# Patient Record
Sex: FEMALE | Race: OTHER | HISPANIC OR LATINO
[De-identification: names, ages, dates, MRNs, and addresses within clinical notes are randomized per-mention and may not be internally consistent; named-entity substitution may affect disease eponyms.]

---

## 2018-01-25 ENCOUNTER — LABORATORY RESULT (OUTPATIENT)
Age: 38
End: 2018-01-25

## 2018-01-26 ENCOUNTER — APPOINTMENT (OUTPATIENT)
Dept: OBGYN | Facility: CLINIC | Age: 38
End: 2018-01-26
Payer: MEDICAID

## 2018-01-26 VITALS
BODY MASS INDEX: 23 KG/M2 | SYSTOLIC BLOOD PRESSURE: 120 MMHG | WEIGHT: 125 LBS | HEIGHT: 62 IN | DIASTOLIC BLOOD PRESSURE: 80 MMHG

## 2018-01-26 DIAGNOSIS — N97.9 FEMALE INFERTILITY, UNSPECIFIED: ICD-10-CM

## 2018-01-26 PROCEDURE — 99212 OFFICE O/P EST SF 10 MIN: CPT | Mod: 25

## 2018-01-26 PROCEDURE — 99395 PREV VISIT EST AGE 18-39: CPT

## 2019-04-05 ENCOUNTER — APPOINTMENT (OUTPATIENT)
Dept: OBGYN | Facility: CLINIC | Age: 39
End: 2019-04-05
Payer: MEDICAID

## 2019-04-05 VITALS — DIASTOLIC BLOOD PRESSURE: 70 MMHG | BODY MASS INDEX: 26.89 KG/M2 | WEIGHT: 147 LBS | SYSTOLIC BLOOD PRESSURE: 122 MMHG

## 2019-04-05 PROCEDURE — 99395 PREV VISIT EST AGE 18-39: CPT

## 2020-08-13 ENCOUNTER — LABORATORY RESULT (OUTPATIENT)
Age: 40
End: 2020-08-13

## 2020-08-14 ENCOUNTER — APPOINTMENT (OUTPATIENT)
Dept: OBGYN | Facility: CLINIC | Age: 40
End: 2020-08-14
Payer: MEDICAID

## 2020-08-14 ENCOUNTER — APPOINTMENT (OUTPATIENT)
Dept: OBGYN | Facility: CLINIC | Age: 40
End: 2020-08-14

## 2020-08-14 VITALS
SYSTOLIC BLOOD PRESSURE: 122 MMHG | DIASTOLIC BLOOD PRESSURE: 74 MMHG | WEIGHT: 139 LBS | TEMPERATURE: 97 F | BODY MASS INDEX: 25.42 KG/M2

## 2020-08-14 PROCEDURE — 99395 PREV VISIT EST AGE 18-39: CPT

## 2021-08-11 ENCOUNTER — LABORATORY RESULT (OUTPATIENT)
Age: 41
End: 2021-08-11

## 2021-08-12 ENCOUNTER — APPOINTMENT (OUTPATIENT)
Dept: OBGYN | Facility: CLINIC | Age: 41
End: 2021-08-12
Payer: MEDICAID

## 2021-08-12 VITALS — BODY MASS INDEX: 23.78 KG/M2 | WEIGHT: 130 LBS | DIASTOLIC BLOOD PRESSURE: 70 MMHG | SYSTOLIC BLOOD PRESSURE: 124 MMHG

## 2021-08-12 PROCEDURE — 99396 PREV VISIT EST AGE 40-64: CPT

## 2022-02-16 ENCOUNTER — APPOINTMENT (OUTPATIENT)
Dept: OTOLARYNGOLOGY | Facility: CLINIC | Age: 42
End: 2022-02-16
Payer: MEDICAID

## 2022-02-16 ENCOUNTER — NON-APPOINTMENT (OUTPATIENT)
Age: 42
End: 2022-02-16

## 2022-02-16 VITALS
TEMPERATURE: 97.9 F | BODY MASS INDEX: 23 KG/M2 | HEART RATE: 85 BPM | DIASTOLIC BLOOD PRESSURE: 78 MMHG | SYSTOLIC BLOOD PRESSURE: 117 MMHG | WEIGHT: 125 LBS | HEIGHT: 62 IN

## 2022-02-16 DIAGNOSIS — M26.609 UNSPECIFIED TEMPOROMANDIBULAR JOINT DISORDER: ICD-10-CM

## 2022-02-16 PROCEDURE — 99204 OFFICE O/P NEW MOD 45 MIN: CPT | Mod: 25

## 2022-02-16 NOTE — HISTORY OF PRESENT ILLNESS
[No] : patient does not have a  history of radiation therapy [de-identified] : 40 yo female\par Recurrent vertigo w/ assoc left tinnitus now w/ bilat tinnitus x months-yrs\par Hearing Losss L>R\par tx w/ po and IT steroids by another ENT\par Alos taking a diuretic for r/o Meniere's syndrome\par h/o bruxism in past and\par increased anxiety and stress at home (autistic child)\par no other modifying factors\par no nasal or throat complaints\par  [Tinnitus] : tinnitus [Vertigo] : vertigo [Dizziness] : dizziness [Hearing Loss] : hearing loss [Meniere Disease] : Meniere disease [Early Onset Hearing Loss] : no early onset hearing loss [Stroke] : no stroke [Allergic Rhinitis] : no allergic rhinitis [Environmental Allergies] : no environmental allergies [Adenoidectomy] : no adenoidectomy [Allergies] : no allergies [Asthma] : no asthma [Hyperthyroidism] : no hyperthyroidism [Sialadenitis] : no sialadenitis [Hodgkin Disease] : no hodgkin disease [Non-Hodgkin Lymphoma] : no non-hodgkin lymphoma [None] : No risk factors have been identified. [Graves Disease] : no graves disease [Thyroid Cancer] : no thyroid cancer

## 2022-02-16 NOTE — REASON FOR VISIT
[Initial Evaluation] : an initial evaluation for [Tinnitus] : tinnitus [Vertigo] : vertigo [FreeTextEntry2] : ringing in ears

## 2022-02-16 NOTE — DATA REVIEWED
[de-identified] : audio personall reviewed\par SNHL L>R [de-identified] : personally reviewed-alejandrol

## 2022-02-16 NOTE — REVIEW OF SYSTEMS
[Ear Itch] : ear itch [Hearing Loss] : hearing loss [Dizziness] : dizziness [Vertigo] : vertigo [Negative] : Allergies [Ear Pain] : no ear pain [Recurrent Ear Infections] : no recurrent ear infections [Lightheadedness] : no lightheadedness [Ear Drainage] : no ear drainage [Ear Noises] : no ear noises [de-identified] : hearing loss in left ear

## 2022-03-30 ENCOUNTER — APPOINTMENT (OUTPATIENT)
Dept: OTOLARYNGOLOGY | Facility: CLINIC | Age: 42
End: 2022-03-30
Payer: MEDICAID

## 2022-03-30 VITALS
BODY MASS INDEX: 23 KG/M2 | HEART RATE: 78 BPM | DIASTOLIC BLOOD PRESSURE: 73 MMHG | SYSTOLIC BLOOD PRESSURE: 108 MMHG | HEIGHT: 62 IN | TEMPERATURE: 98.2 F | WEIGHT: 125 LBS

## 2022-03-30 PROCEDURE — 99214 OFFICE O/P EST MOD 30 MIN: CPT | Mod: 25

## 2022-03-30 NOTE — END OF VISIT
[FreeTextEntry3] : I personally saw and examined SOPHIA SANCHEZ in detail. I spoke to SANJU Giang regarding the assessment and plan of care. I reviewed the above assessment and plan of care, and agree. I have made changes in changes in the body of the note where appropriate.I personally reviewed the HPI, PMH, FH, SH, ROS and medications/allergies. I have spoken to SANJU Giang regarding the history and have personally determined the assessment and plan of care, and documented this myself. I was present and participated in all key portions of the encounter and all procedures noted above. I have made changes in the body of the note where appropriate.\par \par Attesting Faculty: See Attending Signature Below \par \par \par  [Time Spent: ___ minutes] : I have spent [unfilled] minutes of time on the encounter.

## 2022-03-30 NOTE — REASON FOR VISIT
[Tinnitus] : tinnitus [Vertigo] : vertigo [Subsequent Evaluation] : a subsequent evaluation for [FreeTextEntry2] : ringing in ears

## 2022-03-30 NOTE — ASSESSMENT
[FreeTextEntry1] : Ms. SANCHEZ 41 year F with Left Menieres Disease presents for follow up.\par \par Menieres Disease:\par -continue diuretic \par -low salt diet advised\par -Acupuncture advised\par \par Ear itch due to Wax\par -Cerumen is removed from the right and left  ear canal with a curette and suction.\par -Rx:Debrox be placed in both ears on a routine basis to keep them free of wax.\par -Routine debridement suggested to keep the ears free of wax.\par \par f/u prn

## 2022-03-30 NOTE — HISTORY OF PRESENT ILLNESS
[No] : patient does not have a  history of radiation therapy [Tinnitus] : tinnitus [Vertigo] : vertigo [Dizziness] : dizziness [Hearing Loss] : hearing loss [Meniere Disease] : Meniere disease [None] : No risk factors have been identified. [de-identified] : 42 yo female\par Recurrent vertigo w/ assoc left tinnitus now w/ bilat tinnitus x months-yrs\par Hearing Losss L>R\par tx w/ po and IT steroids by another ENT\par Alos taking a diuretic for r/o Meniere's syndrome\par h/o bruxism in past and\par increased anxiety and stress at home (autistic child)\par no other modifying factors\par no nasal or throat complaints\par  [FreeTextEntry1] : 3/30/202\par Patient presents for follow up. States she is doing well with the water pill. SHe tried getting off the water pill the fullness and tinnitus returned in the left ear. Dizziness is controlled. She also complains of bilateral ear itch.  [Early Onset Hearing Loss] : no early onset hearing loss [Stroke] : no stroke [Allergic Rhinitis] : no allergic rhinitis [Environmental Allergies] : no environmental allergies [Adenoidectomy] : no adenoidectomy [Allergies] : no allergies [Asthma] : no asthma [Hyperthyroidism] : no hyperthyroidism [Sialadenitis] : no sialadenitis [Hodgkin Disease] : no hodgkin disease [Non-Hodgkin Lymphoma] : no non-hodgkin lymphoma [Graves Disease] : no graves disease [Thyroid Cancer] : no thyroid cancer

## 2022-03-30 NOTE — PHYSICAL EXAM
[Fukuda Step Test] : Fukuda Step Test was Positive [Midline] : trachea located in midline position [Normal] : no rashes [Hearing Loss Right Only] : normal [Hearing Loss Left Only] : normal [Nystagmus] : ~T no ~M nystagmus was seen [FreeTextEntry8] : wax [FreeTextEntry9] : wax [de-identified] : Sway to the right

## 2022-03-30 NOTE — DATA REVIEWED
[de-identified] : audio personall reviewed\par SNHL L>R [de-identified] : personally reviewed-alejandrol

## 2022-03-30 NOTE — REVIEW OF SYSTEMS
[Ear Itch] : ear itch [Hearing Loss] : hearing loss [Dizziness] : dizziness [Vertigo] : vertigo [Negative] : Allergies [Ear Pain] : no ear pain [Recurrent Ear Infections] : no recurrent ear infections [Lightheadedness] : no lightheadedness [Ear Drainage] : no ear drainage [Ear Noises] : no ear noises [de-identified] : hearing loss in left ear

## 2022-04-01 ENCOUNTER — NON-APPOINTMENT (OUTPATIENT)
Age: 42
End: 2022-04-01

## 2022-04-01 RX ORDER — MOMETASONE FUROATE 1 MG/ML
0.1 SOLUTION TOPICAL
Qty: 1 | Refills: 5 | Status: ACTIVE | COMMUNITY
Start: 2022-04-01 | End: 1900-01-01

## 2022-06-23 ENCOUNTER — APPOINTMENT (OUTPATIENT)
Dept: OTOLARYNGOLOGY | Facility: CLINIC | Age: 42
End: 2022-06-23
Payer: MEDICAID

## 2022-06-23 VITALS
BODY MASS INDEX: 22.08 KG/M2 | TEMPERATURE: 98.1 F | DIASTOLIC BLOOD PRESSURE: 70 MMHG | SYSTOLIC BLOOD PRESSURE: 104 MMHG | WEIGHT: 120 LBS | HEIGHT: 62 IN | HEART RATE: 77 BPM

## 2022-06-23 DIAGNOSIS — L29.9 PRURITUS, UNSPECIFIED: ICD-10-CM

## 2022-06-23 PROCEDURE — 99214 OFFICE O/P EST MOD 30 MIN: CPT | Mod: 25

## 2022-06-23 PROCEDURE — 92567 TYMPANOMETRY: CPT

## 2022-06-23 PROCEDURE — 92588 EVOKED AUDITORY TST COMPLETE: CPT

## 2022-06-23 PROCEDURE — 92557 COMPREHENSIVE HEARING TEST: CPT

## 2022-06-23 RX ORDER — CHOLECALCIFEROL (VITAMIN D3) 1250 MCG
1.25 MG CAPSULE ORAL
Qty: 4 | Refills: 0 | Status: ACTIVE | COMMUNITY
Start: 2022-05-07

## 2022-06-23 RX ORDER — FOLIC ACID 1 MG/1
1 TABLET ORAL
Qty: 30 | Refills: 0 | Status: ACTIVE | COMMUNITY
Start: 2021-12-06

## 2022-06-23 RX ORDER — PREDNISONE 20 MG/1
20 TABLET ORAL
Qty: 8 | Refills: 0 | Status: ACTIVE | COMMUNITY
Start: 2022-06-20

## 2022-06-23 RX ORDER — AMOXICILLIN 500 MG/1
500 CAPSULE ORAL
Qty: 30 | Refills: 0 | Status: ACTIVE | COMMUNITY
Start: 2022-06-19

## 2022-06-23 RX ORDER — CIPROFLOXACIN HYDROCHLORIDE 500 MG/1
500 TABLET, FILM COATED ORAL
Qty: 14 | Refills: 0 | Status: ACTIVE | COMMUNITY
Start: 2022-06-01

## 2022-06-23 NOTE — HISTORY OF PRESENT ILLNESS
[No] : patient does not have a  history of radiation therapy [Tinnitus] : tinnitus [Vertigo] : vertigo [Dizziness] : dizziness [Hearing Loss] : hearing loss [Meniere Disease] : Meniere disease [None] : No risk factors have been identified. [de-identified] : 41 yo female\par  Dizzy x 5 days and decreased hearing left ear despite po steroids and diet control\par recently stopped taking diuretic\par no other modifying factors\par no nasal or throat complaints\par  [Early Onset Hearing Loss] : no early onset hearing loss [Stroke] : no stroke [Allergic Rhinitis] : no allergic rhinitis [Adenoidectomy] : no adenoidectomy [Allergies] : no allergies [Asthma] : no asthma [Hyperthyroidism] : no hyperthyroidism [Sialadenitis] : no sialadenitis [Hodgkin Disease] : no hodgkin disease [Non-Hodgkin Lymphoma] : no non-hodgkin lymphoma [Graves Disease] : no graves disease [Thyroid Cancer] : no thyroid cancer

## 2022-06-23 NOTE — PHYSICAL EXAM
[Fukuda Step Test] : Fukuda Step Test was Positive [Midline] : trachea located in midline position [Normal] : no rashes [Nystagmus] : ~T no ~M nystagmus was seen [Fistula Sign] : Fistula Sign: Negative [de-identified] : weak step test [de-identified] : weak Analia

## 2022-06-23 NOTE — DATA REVIEWED
[de-identified] : Hearing Test performed to evaluate the extent of hearing loss and  to explain pt's symptoms\par today's hearing test was personally reviewed and revealed\par AD- -8000 Hz, AS- WNL sloping to mild SNHL 250-8000 Hz. Type A tymps AU. Excellent WRS AU.

## 2022-07-01 ENCOUNTER — NON-APPOINTMENT (OUTPATIENT)
Age: 42
End: 2022-07-01

## 2022-07-05 ENCOUNTER — NON-APPOINTMENT (OUTPATIENT)
Age: 42
End: 2022-07-05

## 2022-07-08 ENCOUNTER — NON-APPOINTMENT (OUTPATIENT)
Age: 42
End: 2022-07-08

## 2022-07-13 ENCOUNTER — APPOINTMENT (OUTPATIENT)
Dept: OTOLARYNGOLOGY | Facility: CLINIC | Age: 42
End: 2022-07-13

## 2022-07-13 VITALS
DIASTOLIC BLOOD PRESSURE: 68 MMHG | WEIGHT: 120 LBS | HEART RATE: 82 BPM | SYSTOLIC BLOOD PRESSURE: 115 MMHG | HEIGHT: 62 IN | TEMPERATURE: 97.9 F | BODY MASS INDEX: 22.08 KG/M2

## 2022-07-13 DIAGNOSIS — R42 DIZZINESS AND GIDDINESS: ICD-10-CM

## 2022-07-13 PROCEDURE — 99214 OFFICE O/P EST MOD 30 MIN: CPT

## 2022-07-13 RX ORDER — DIAZEPAM 2 MG/1
2 TABLET ORAL
Qty: 40 | Refills: 0 | Status: ACTIVE | COMMUNITY
Start: 2022-07-13 | End: 1900-01-01

## 2022-07-13 NOTE — PHYSICAL EXAM
[Hearing Loss Right Only] : normal [Hearing Loss Left Only] : normal [Nystagmus] : ~T no ~M nystagmus was seen [Fukuda Step Test] : Fukuda Step Test was Negative [de-identified] : weak step test  [Midline] : trachea located in midline position [Normal] : no rashes

## 2022-07-13 NOTE — HISTORY OF PRESENT ILLNESS
[de-identified] : 41 yo female\par Patient with hx of Left Menieres Disease complains she was in the ED last week for dizziness. States she is feeling extremely dizzy, any movement she makes her dizzy. She was treated with Prednisone lakesha at the ED yet she continues to feel off balance. She is taking HCTZ and watching her salt intake. She does not notice any change in hearing with dizziness.  [Ear Fullness] : ear fullness [Tinnitus] : tinnitus [Dizziness] : dizziness [Hearing Loss] : hearing loss [Meniere Disease] : Meniere disease [Early Onset Hearing Loss] : no early onset hearing loss [Stroke] : no stroke [Allergic Rhinitis] : no allergic rhinitis [Adenoidectomy] : no adenoidectomy [Allergies] : no allergies [Asthma] : no asthma [None] : No associated symptoms are reported.

## 2022-07-13 NOTE — ASSESSMENT
[FreeTextEntry1] : Ms. SANCHEZ 42 year F with Left Menieres Disease complains of feeling dizzy and off balance since last visit. She was in the ED last week, treated with Prednisone lakesha \par \par Left Menieres Disease:\par -Rx: Valium, take when with vertigo \par Rx: Betahistine \par already on diuretic and diet\par -VNG/ABR/ECOG ordered, will call with results \par -Possible IT steroids \par \par f/u after data collection

## 2022-07-29 ENCOUNTER — APPOINTMENT (OUTPATIENT)
Dept: OTOLARYNGOLOGY | Facility: CLINIC | Age: 42
End: 2022-07-29

## 2022-07-29 PROCEDURE — 92540 BASIC VESTIBULAR EVALUATION: CPT

## 2022-07-29 PROCEDURE — 92547 SUPPLEMENTAL ELECTRICAL TEST: CPT

## 2022-07-29 PROCEDURE — 92537 CALORIC VSTBLR TEST W/REC: CPT

## 2022-07-29 PROCEDURE — 92584 ELECTROCOCHLEOGRAPHY: CPT

## 2022-07-29 PROCEDURE — 92567 TYMPANOMETRY: CPT

## 2022-07-29 PROCEDURE — ZZZZZ: CPT

## 2022-07-29 PROCEDURE — 92653 AEP NEURODIAGNOSTIC I&R: CPT

## 2022-08-02 ENCOUNTER — NON-APPOINTMENT (OUTPATIENT)
Age: 42
End: 2022-08-02

## 2022-09-14 ENCOUNTER — LABORATORY RESULT (OUTPATIENT)
Age: 42
End: 2022-09-14

## 2022-09-15 ENCOUNTER — APPOINTMENT (OUTPATIENT)
Dept: OBGYN | Facility: CLINIC | Age: 42
End: 2022-09-15

## 2022-09-15 VITALS — DIASTOLIC BLOOD PRESSURE: 76 MMHG | BODY MASS INDEX: 21.95 KG/M2 | SYSTOLIC BLOOD PRESSURE: 118 MMHG | WEIGHT: 120 LBS

## 2022-09-15 PROCEDURE — 99396 PREV VISIT EST AGE 40-64: CPT

## 2022-09-23 ENCOUNTER — APPOINTMENT (OUTPATIENT)
Dept: OTOLARYNGOLOGY | Facility: CLINIC | Age: 42
End: 2022-09-23

## 2022-09-23 VITALS
BODY MASS INDEX: 22.08 KG/M2 | WEIGHT: 120 LBS | TEMPERATURE: 97.2 F | HEART RATE: 60 BPM | SYSTOLIC BLOOD PRESSURE: 101 MMHG | HEIGHT: 62 IN | DIASTOLIC BLOOD PRESSURE: 72 MMHG

## 2022-09-23 PROCEDURE — 99213 OFFICE O/P EST LOW 20 MIN: CPT

## 2022-09-23 NOTE — ASSESSMENT
[FreeTextEntry1] : Ms. SANCHEZ 42 year F with Left Menieres Disease presents for follow up, room spinning resolved. She is going to Vestibular rehab for imbalance which is helping. \par \par Left Menieres Disease:\par -Continue with HCTZ qod  and low salt diet \par -continue with vestibular rehab and at home exercises \par \par \par f/u 4 months or prn

## 2022-09-23 NOTE — PHYSICAL EXAM
[Midline] : trachea located in midline position [Normal] : gait was normal [Hearing Loss Right Only] : normal [Hearing Loss Left Only] : normal [Nystagmus] : ~T no ~M nystagmus was seen [Fukuda Step Test] : Fukuda Step Test was Negative

## 2022-09-23 NOTE — END OF VISIT
[Time Spent: ___ minutes] : I have spent [unfilled] minutes of time on the encounter. [FreeTextEntry3] : I personally saw and examined SOPHIA SANCHEZ in detail. I spoke to SANJU Giang regarding the assessment and plan of care. I reviewed the above assessment and plan of care, and agree. I have made changes in changes in the body of the note where appropriate.I personally reviewed the HPI, PMH, FH, SH, ROS and medications/allergies. I have spoken to SANJU Giang regarding the history and have personally determined the assessment and plan of care, and documented this myself. I was present and participated in all key portions of the encounter and all procedures noted above. I have made changes in the body of the note where appropriate.\par \par Attesting Faculty: See Attending Signature Below \par \par \par

## 2022-09-23 NOTE — HISTORY OF PRESENT ILLNESS
[Ear Fullness] : ear fullness [Tinnitus] : tinnitus [Dizziness] : dizziness [Hearing Loss] : hearing loss [Meniere Disease] : Meniere disease [None] : No associated symptoms are reported. [de-identified] : 41 yo female\par Patient with hx of Left Menieres Disease presents for follow up. States room spinning has resolved she is going to vestibular rehab for her imbalance. She is taking HCTZ and watching her salt intake. She does not notice any change in hearing with dizziness.  [Early Onset Hearing Loss] : no early onset hearing loss [Stroke] : no stroke [Allergic Rhinitis] : no allergic rhinitis [Adenoidectomy] : no adenoidectomy [Allergies] : no allergies [Asthma] : no asthma

## 2022-11-11 ENCOUNTER — RX RENEWAL (OUTPATIENT)
Age: 42
End: 2022-11-11

## 2023-01-12 ENCOUNTER — APPOINTMENT (OUTPATIENT)
Dept: OTOLARYNGOLOGY | Facility: CLINIC | Age: 43
End: 2023-01-12
Payer: MEDICAID

## 2023-01-12 VITALS
SYSTOLIC BLOOD PRESSURE: 101 MMHG | DIASTOLIC BLOOD PRESSURE: 67 MMHG | TEMPERATURE: 98.2 F | HEIGHT: 62 IN | WEIGHT: 120 LBS | BODY MASS INDEX: 22.08 KG/M2 | HEART RATE: 86 BPM

## 2023-01-12 PROCEDURE — 99213 OFFICE O/P EST LOW 20 MIN: CPT | Mod: 25

## 2023-01-12 NOTE — PHYSICAL EXAM
[Midline] : trachea located in midline position [Normal] : gait was normal [Nystagmus] : ~T no ~M nystagmus was seen [Fukuda Step Test] : Fukuda Step Test was Negative [FreeTextEntry8] : wax [FreeTextEntry9] : wax

## 2023-01-12 NOTE — HISTORY OF PRESENT ILLNESS
[Ear Fullness] : ear fullness [Tinnitus] : tinnitus [Dizziness] : dizziness [Hearing Loss] : hearing loss [Meniere Disease] : Meniere disease [None] : No associated symptoms are reported. [de-identified] : 43 yo female\par Patient with hx of Left Menieres Disease presents for follow up. States she is doing well, dizzy spells resolved. SHe is taking HCTZ every other day. There are some days where she feels off balance but it resolves quickly. She is watching her salt intake.   [Early Onset Hearing Loss] : no early onset hearing loss [Stroke] : no stroke [Allergic Rhinitis] : no allergic rhinitis [Adenoidectomy] : no adenoidectomy [Allergies] : no allergies [Asthma] : no asthma

## 2023-01-12 NOTE — ASSESSMENT
[FreeTextEntry1] : Ms. SANCHEZ 42 year F with Left Menieres Disease presents for follow up, dizzy spells resolved. Taking HCTZ every other day. Watching her salt in take \par \par Left Menieres Disease:\par -Advised to started taking HCTZ twice a week and low salt diet  \par -wax cleaned from both ears \par \par \par f/u 3 months or prn

## 2023-03-23 ENCOUNTER — APPOINTMENT (OUTPATIENT)
Dept: OBGYN | Facility: CLINIC | Age: 43
End: 2023-03-23

## 2023-04-20 ENCOUNTER — APPOINTMENT (OUTPATIENT)
Dept: OTOLARYNGOLOGY | Facility: CLINIC | Age: 43
End: 2023-04-20
Payer: MEDICAID

## 2023-04-20 VITALS
SYSTOLIC BLOOD PRESSURE: 100 MMHG | WEIGHT: 120 LBS | TEMPERATURE: 98 F | HEART RATE: 74 BPM | BODY MASS INDEX: 22.08 KG/M2 | HEIGHT: 62 IN | DIASTOLIC BLOOD PRESSURE: 71 MMHG

## 2023-04-20 PROCEDURE — 99213 OFFICE O/P EST LOW 20 MIN: CPT

## 2023-04-20 RX ORDER — HYDROCHLOROTHIAZIDE 12.5 MG/1
12.5 TABLET ORAL
Qty: 30 | Refills: 0 | Status: ACTIVE | COMMUNITY
Start: 2022-01-20 | End: 1900-01-01

## 2023-04-20 NOTE — ASSESSMENT
[FreeTextEntry1] : Ms. SANCHEZ 43 year F with Left Menieres Disease presents for follow up, doing well taking HCTZ every 2 days. Watching her salt in take \par \par Left Menieres Disease:\par -Advised to started taking HCTZ every 3 days \par -Rx: Hydrochlorothiazide\par -continue low salt diet \par -wax cleaned from both ears \par -audio next visit \par \par \par f/u 6 months or prn

## 2023-04-20 NOTE — HISTORY OF PRESENT ILLNESS
[Ear Fullness] : ear fullness [Tinnitus] : tinnitus [Dizziness] : dizziness [Hearing Loss] : hearing loss [Meniere Disease] : Meniere disease [None] : No associated symptoms are reported. [de-identified] : 44 yo female\par Patient with hx of Left Menieres Disease presents for follow up. States she is doing well, dizzy spells resolved. SHe is taking HCTZ every 2 days. She is watching her salt intake. Last dizzy episode was in July. No change in hearing.  [Early Onset Hearing Loss] : no early onset hearing loss [Stroke] : no stroke [Allergic Rhinitis] : no allergic rhinitis [Adenoidectomy] : no adenoidectomy [Allergies] : no allergies [Asthma] : no asthma

## 2023-08-15 ENCOUNTER — APPOINTMENT (OUTPATIENT)
Dept: PODIATRY | Facility: CLINIC | Age: 43
End: 2023-08-15
Payer: MEDICAID

## 2023-08-15 PROCEDURE — 73630 X-RAY EXAM OF FOOT: CPT | Mod: RT

## 2023-08-15 PROCEDURE — 99213 OFFICE O/P EST LOW 20 MIN: CPT | Mod: 25

## 2023-08-15 PROCEDURE — 20550 NJX 1 TENDON SHEATH/LIGAMENT: CPT | Mod: RT

## 2023-08-16 NOTE — HISTORY OF PRESENT ILLNESS
[FreeTextEntry1] : Patient presents today with a painful right heel that has been present for several weeks and getting progressively worse.   She had this 3 years ago in the same heel.  Patient has typical post static dyskinesia and pain on palpation of the medial tuberosity.  She has pain with first step in the morning and after a period of sitting.  Patient has difficulty ambulating due to pain and discomfort.

## 2023-08-16 NOTE — PROCEDURE
[Plantar Fascia Injection] : ~M plantar fascia (heel) injection [Right Foot] : was performed on the right foot [Risks] : risks [Benefits] : benefits [Alternatives] : alternatives [Patient] : Prior to the start of the procedure a time out was taken and the identity of the patient was confirmed via name and date of birth with the patient. The correct site and the procedure to be performed were confirmed. The correct side was confirmed if applicable. The availability of the correct equipment was verified [1%] : 1%  [25 gauge] : A 25 gauge needle was used [Kenalog 40] : Kenalog 40 [Tolerated Well] : tolerated the procedure well [No Complications] : There were no complications. [Instructions Given] : given handouts/patient instructions [Patient Instructed to Call] : instructed to call if redness at site, a decrease in range of motion or an increase in pain is noted after procedure. [FreeTextEntry1] : X-rays were taken to rule out a stress injury.  (3 views - right foot) No evidence of any stress injury.  Also noted is a large inferior calcaneal spur.

## 2023-08-16 NOTE — PHYSICAL EXAM
[2+] : left foot dorsalis pedis 2+ [No Joint Swelling] : no joint swelling [Normal Foot/Ankle] : Both lower extremities were exposed and visualized. Standing exam demonstrates normal foot posture and alignment. Hindfoot exam shows no hindfoot valgus or varus [Skin Color & Pigmentation] : normal skin color and pigmentation [Skin Turgor] : normal skin turgor [Skin Lesions] : no skin lesions [Sensation] : the sensory exam was normal to light touch and pinprick [No Focal Deficits] : no focal deficits [Deep Tendon Reflexes (DTR)] : deep tendon reflexes were 2+ and symmetric [Motor Exam] : the motor exam was normal [Oriented To Time, Place, And Person] : oriented to person, place, and time [Impaired Insight] : insight and judgment were intact [Affect] : the affect was normal [General Appearance - Alert] : alert [Ankle Swelling (On Exam)] : not present [Varicose Veins Of Lower Extremities] : not present [] : not present [Delayed in the Right Toes] : capillary refills normal in right toes [Delayed in the Left Toes] : capillary refills normal in the left toes [de-identified] : Pain on palpation of the medial tuberosity of the right heel and at the insertion of the plantar fascia.  Pain with stretching of the plantar fascia.  No pain on medial to lateral compression of the heel. [Foot Ulcer] : no foot ulcer [Skin Induration] : no skin induration

## 2023-08-16 NOTE — ASSESSMENT
[Verbal] : verbal [Patient] : patient [Good - alert, interested, motivated] : Good - alert, interested, motivated [Signs and symptoms of infection] : sign and symptoms of infection [Off-loading] : off-loading [Other: ____] : [unfilled] [FreeTextEntry1] : Impression: Plantar fasciitis, right (M72.2), painful.  Calcaneal spur, right (M77.31).  Difficulty walking (R26.2).  Recommendations: Attempt at an injection.  After discussing all risks, benefits and alternatives, patient consented.  Treatment: Patient was injected with a combination of 1% Xylocaine, plain, 0.5% Marcaine, plain and Kenalog-40, a total of 3cc's into the right heel.  Patient was given home care instructions, ice therapy and stretching exercises as well as shoe gear modifications.   Will reevaluate in 2 weeks, if pain persists.  Any questions or problems, patient is to contact the office.

## 2023-08-30 ENCOUNTER — APPOINTMENT (OUTPATIENT)
Dept: PODIATRY | Facility: CLINIC | Age: 43
End: 2023-08-30

## 2023-11-16 ENCOUNTER — APPOINTMENT (OUTPATIENT)
Dept: OTOLARYNGOLOGY | Facility: CLINIC | Age: 43
End: 2023-11-16
Payer: MEDICAID

## 2023-11-16 VITALS
TEMPERATURE: 98 F | DIASTOLIC BLOOD PRESSURE: 68 MMHG | BODY MASS INDEX: 22.08 KG/M2 | HEART RATE: 71 BPM | SYSTOLIC BLOOD PRESSURE: 106 MMHG | WEIGHT: 120 LBS | HEIGHT: 62 IN

## 2023-11-16 DIAGNOSIS — F41.9 ANXIETY DISORDER, UNSPECIFIED: ICD-10-CM

## 2023-11-16 DIAGNOSIS — M77.31 CALCANEAL SPUR, RIGHT FOOT: ICD-10-CM

## 2023-11-16 DIAGNOSIS — Z01.10 ENCOUNTER FOR EXAMINATION OF EARS AND HEARING W/OUT ABNORMAL FINDINGS: ICD-10-CM

## 2023-11-16 PROCEDURE — 92567 TYMPANOMETRY: CPT

## 2023-11-16 PROCEDURE — 92557 COMPREHENSIVE HEARING TEST: CPT

## 2023-11-16 PROCEDURE — 99214 OFFICE O/P EST MOD 30 MIN: CPT | Mod: 25

## 2023-11-16 RX ORDER — HYDROCHLOROTHIAZIDE 12.5 MG/1
12.5 CAPSULE ORAL DAILY
Qty: 30 | Refills: 5 | Status: ACTIVE | COMMUNITY
Start: 2023-11-16 | End: 1900-01-01

## 2023-12-07 ENCOUNTER — LABORATORY RESULT (OUTPATIENT)
Age: 43
End: 2023-12-07

## 2023-12-07 ENCOUNTER — APPOINTMENT (OUTPATIENT)
Dept: OBGYN | Facility: CLINIC | Age: 43
End: 2023-12-07
Payer: MEDICAID

## 2023-12-07 VITALS
HEART RATE: 103 BPM | BODY MASS INDEX: 21.22 KG/M2 | DIASTOLIC BLOOD PRESSURE: 73 MMHG | WEIGHT: 116 LBS | OXYGEN SATURATION: 99 % | SYSTOLIC BLOOD PRESSURE: 116 MMHG

## 2023-12-07 DIAGNOSIS — Z01.419 ENCOUNTER FOR GYNECOLOGICAL EXAMINATION (GENERAL) (ROUTINE) W/OUT ABNORMAL FINDINGS: ICD-10-CM

## 2023-12-07 PROCEDURE — 99386 PREV VISIT NEW AGE 40-64: CPT

## 2024-02-29 ENCOUNTER — APPOINTMENT (OUTPATIENT)
Dept: OTOLARYNGOLOGY | Facility: CLINIC | Age: 44
End: 2024-02-29
Payer: MEDICAID

## 2024-02-29 VITALS
BODY MASS INDEX: 21.35 KG/M2 | HEIGHT: 62 IN | SYSTOLIC BLOOD PRESSURE: 95 MMHG | DIASTOLIC BLOOD PRESSURE: 55 MMHG | HEART RATE: 75 BPM | WEIGHT: 116 LBS

## 2024-02-29 DIAGNOSIS — H81.02 MENIERE'S DISEASE, LEFT EAR: ICD-10-CM

## 2024-02-29 DIAGNOSIS — H90.3 SENSORINEURAL HEARING LOSS, BILATERAL: ICD-10-CM

## 2024-02-29 DIAGNOSIS — H61.23 IMPACTED CERUMEN, BILATERAL: ICD-10-CM

## 2024-02-29 DIAGNOSIS — H93.13 TINNITUS, BILATERAL: ICD-10-CM

## 2024-02-29 PROCEDURE — 99214 OFFICE O/P EST MOD 30 MIN: CPT | Mod: 25

## 2024-02-29 RX ORDER — HYDROCHLOROTHIAZIDE 12.5 MG/1
12.5 CAPSULE ORAL
Qty: 90 | Refills: 3 | Status: ACTIVE | COMMUNITY
Start: 2022-11-11 | End: 1900-01-01

## 2024-02-29 NOTE — PROCEDURE
[FreeTextEntry3] : Procedure - Cerumen Removal.  After informed verbal consent is obtained, cerumen is removed from the b/l ear canals with a curette.  Normal appearing canal following removal.

## 2024-02-29 NOTE — ASSESSMENT
[FreeTextEntry1] : Left Menieres Dz, Doing well with 12.5 mg HCTZ one a week, last episode of Vertigo was 2 years ago: - Continue HCTZ every week maintain low salt diet - F/U in 6 months  Cerumen: - Removed

## 2024-02-29 NOTE — HISTORY OF PRESENT ILLNESS
[de-identified] : 44 y/o F with hx of left Meniere's Dz.  is taking HCTZ 12.5 mg once a week.  She notes her last episode of Vertigo was 2 years ago.  No change in hearing or tinnitus.  Following a low salt diet.

## 2024-02-29 NOTE — PHYSICAL EXAM
[Midline] : trachea located in midline position [de-identified] : b/l vincentn [Normal] : no rashes

## 2024-02-29 NOTE — END OF VISIT
[FreeTextEntry3] : I personally saw and examined SOPHIA SANCHEZ in detail. I spoke to SANJU Vance regarding the assessment and plan of care. I reviewed the above assessment and plan of care, and agree. I have made changes in changes in the body of the note where appropriate.I personally reviewed the HPI, PMH, FH, SH, ROS and medications/allergies. I have spoken to SANJU Vance regarding the history and have personally determined the assessment and plan of care, and documented this myself. I was present and participated in all key portions of the encounter and all procedures noted above. I have made changes in the body of the note where appropriate.   Attesting Faculty: See Attending Signature Below

## 2024-05-20 ENCOUNTER — APPOINTMENT (OUTPATIENT)
Dept: PODIATRY | Facility: CLINIC | Age: 44
End: 2024-05-20
Payer: MEDICAID

## 2024-05-20 DIAGNOSIS — M72.2 PLANTAR FASCIAL FIBROMATOSIS: ICD-10-CM

## 2024-05-20 DIAGNOSIS — R26.2 DIFFICULTY IN WALKING, NOT ELSEWHERE CLASSIFIED: ICD-10-CM

## 2024-05-20 PROCEDURE — 20550 NJX 1 TENDON SHEATH/LIGAMENT: CPT | Mod: RT

## 2024-05-20 PROCEDURE — 99213 OFFICE O/P EST LOW 20 MIN: CPT | Mod: 25

## 2024-05-20 NOTE — PROCEDURE
[Plantar Fascia Injection] : ~M plantar fascia (heel) injection [Right Foot] : was performed on the right foot [Risks] : risks [Benefits] : benefits [Alternatives] : alternatives [Patient] : Prior to the start of the procedure a time out was taken and the identity of the patient was confirmed via name and date of birth with the patient. The correct site and the procedure to be performed were confirmed. The correct side was confirmed if applicable. The availability of the correct equipment was verified [1%] : 1%  [25 gauge] : A 25 gauge needle was used [Kenalog 40] : Kenalog 40 [Tolerated Well] : tolerated the procedure well [No Complications] : There were no complications. [Instructions Given] : given handouts/patient instructions [Patient Instructed to Call] : instructed to call if redness at site, a decrease in range of motion or an increase in pain is noted after procedure.

## 2024-05-21 NOTE — ASSESSMENT
[FreeTextEntry1] : Impression: Plantar fasciitis, right, slightly more lateral (M72.2), painful. Difficulty walking (R26.2).  Recommendations: Attempt at an injection. After discussing all risks, benefits and alternatives, patient consented.  Treatment: Patient was injected with a combination of 1% Xylocaine, plain, 0.5% Marcaine, plain and Kenalog-40, a total of 3cc's into the right heel, lateral aspect. Patient was given home care instructions, ice therapy and stretching exercises as well as shoe gear modifications. Will reevaluate in 2 weeks, if pain persists. Any questions or problems, patient is to contact the office.

## 2024-05-21 NOTE — HISTORY OF PRESENT ILLNESS
[FreeTextEntry1] : Patient presents today with recurrent pain in the right heel.  She had been given an injection last year that gave her complete relief.  The pain was more medial at that time and now it appears to be more lateral, causing pain and discomfort.  The left is asymptomatic.  Patient has typical post static dyskinesia and pain on palpation on the lateral tubercle and plantar aspect of the left heel.  No new changes to her medical status.

## 2024-05-21 NOTE — PHYSICAL EXAM
[2+] : left foot dorsalis pedis 2+ [No Joint Swelling] : no joint swelling [Normal Foot/Ankle] : Both lower extremities were exposed and visualized. Standing exam demonstrates normal foot posture and alignment. Hindfoot exam shows no hindfoot valgus or varus [Skin Color & Pigmentation] : normal skin color and pigmentation [Skin Turgor] : normal skin turgor [Skin Lesions] : no skin lesions [Sensation] : the sensory exam was normal to light touch and pinprick [No Focal Deficits] : no focal deficits [Deep Tendon Reflexes (DTR)] : deep tendon reflexes were 2+ and symmetric [Motor Exam] : the motor exam was normal [General Appearance - Alert] : alert [Oriented To Time, Place, And Person] : oriented to person, place, and time [Ankle Swelling (On Exam)] : not present [Varicose Veins Of Lower Extremities] : not present [] : not present [Delayed in the Right Toes] : capillary refills normal in right toes [Delayed in the Left Toes] : capillary refills normal in the left toes [de-identified] : Pain on palpation of the medial tuberosity of the right heel and at the insertion of the plantar fascia.  Pain with stretching of the plantar fascia.  No pain on medial to lateral compression of the heel. [Foot Ulcer] : no foot ulcer [Skin Induration] : no skin induration

## 2024-08-09 ENCOUNTER — APPOINTMENT (OUTPATIENT)
Dept: OTOLARYNGOLOGY | Facility: CLINIC | Age: 44
End: 2024-08-09

## 2024-08-09 PROBLEM — H69.92 ETD (EUSTACHIAN TUBE DYSFUNCTION), LEFT: Status: ACTIVE | Noted: 2024-08-09

## 2024-08-09 PROCEDURE — 99214 OFFICE O/P EST MOD 30 MIN: CPT | Mod: 25

## 2024-08-09 NOTE — PROCEDURE
[FreeTextEntry3] : Procedure - Cerumen Removal.  After informed verbal consent is obtained, cerumen is removed from the left ear canals with a curette.  Normal appearing canal following removal.

## 2024-08-09 NOTE — PHYSICAL EXAM
[de-identified] : left cerumen [Midline] : trachea located in midline position [Normal] : no rashes

## 2024-08-09 NOTE — ASSESSMENT
[FreeTextEntry1] : Left Menieres Dz, Doing well with 12.5 mg HCTZ one a week, last episode of Vertigo was 2 years ago: - Continue HCTZ every week maintain low salt diet  r/o ETD at this time rx-Flonase f/u approx 10 days

## 2024-08-09 NOTE — HISTORY OF PRESENT ILLNESS
Airway  Date/Time: 11/15/2019 7:27 AM  Urgency: elective      General Information and Staff    Patient location during procedure: OR  Anesthesiologist: Julian Adams MD  Performed: anesthesiologist     Indications and Patient Condition  Indications for [de-identified] : 44 y/o F with hx of left Meniere's Dz.  is taking HCTZ 12.5 mg once a week.  She notes her last episode of Vertigo was 2 years ago.  No change in hearing or tinnitus.  Following a low salt diet.  [FreeTextEntry1] : 8/9/2024 Left ear fullness no nasal congestion still on low salt diet no other modifying factors no other nasal or throat complaints

## 2024-08-12 ENCOUNTER — NON-APPOINTMENT (OUTPATIENT)
Age: 44
End: 2024-08-12

## 2024-08-12 RX ORDER — METHYLPREDNISOLONE 4 MG/1
4 TABLET ORAL
Qty: 21 | Refills: 1 | Status: ACTIVE | COMMUNITY
Start: 2024-08-12 | End: 1900-01-01

## 2024-08-30 ENCOUNTER — APPOINTMENT (OUTPATIENT)
Dept: OTOLARYNGOLOGY | Facility: CLINIC | Age: 44
End: 2024-08-30
Payer: MEDICAID

## 2024-08-30 VITALS — WEIGHT: 118 LBS | TEMPERATURE: 98 F | BODY MASS INDEX: 21.71 KG/M2 | HEIGHT: 62 IN

## 2024-08-30 DIAGNOSIS — H69.92 UNSPECIFIED EUSTACHIAN TUBE DISORDER, LEFT EAR: ICD-10-CM

## 2024-08-30 DIAGNOSIS — H81.02 MENIERE'S DISEASE, LEFT EAR: ICD-10-CM

## 2024-08-30 DIAGNOSIS — H93.13 TINNITUS, BILATERAL: ICD-10-CM

## 2024-08-30 DIAGNOSIS — Z01.10 ENCOUNTER FOR EXAMINATION OF EARS AND HEARING W/OUT ABNORMAL FINDINGS: ICD-10-CM

## 2024-08-30 DIAGNOSIS — H61.23 IMPACTED CERUMEN, BILATERAL: ICD-10-CM

## 2024-08-30 PROCEDURE — 99213 OFFICE O/P EST LOW 20 MIN: CPT | Mod: 25

## 2024-08-30 PROCEDURE — 92557 COMPREHENSIVE HEARING TEST: CPT

## 2024-08-30 PROCEDURE — 92567 TYMPANOMETRY: CPT

## 2024-09-03 NOTE — PHYSICAL EXAM
[Midline] : trachea located in midline position [Normal] : no rashes [de-identified] : b/l vincentn

## 2024-09-03 NOTE — DATA REVIEWED
[de-identified] : VNG-left perish weakness Hearing Test performed to evaluate the extent of hearing loss and  to explain pt's symptoms today's hearing test was personally reviewed and revealed Tymps-wnl Hearing-left LF SNHL

## 2024-09-03 NOTE — END OF VISIT
[Time Spent: ___ minutes] : I have spent [unfilled] minutes of time on the encounter which excludes teaching and separately reported services. [FreeTextEntry3] : I personally saw and examined SOPHIA SANCHEZ in detail.I have made changes in changes in the body of the note where appropriate. I personally reviewed the HPI, PMH, FH, SH, ROS and medications/allergies. I have spoken to SANJU Vance regarding the history and have personally determined the assessment and plan of care and documented this myself.. I performed all procedures and performed the physical exam. I have made changes in the body of the note where appropriate.   Attesting Faculty: See Attending Signature Below

## 2024-09-03 NOTE — DATA REVIEWED
[de-identified] : VNG-left perish weakness Hearing Test performed to evaluate the extent of hearing loss and  to explain pt's symptoms today's hearing test was personally reviewed and revealed Tymps-wnl Hearing-left LF SNHL

## 2024-09-03 NOTE — HISTORY OF PRESENT ILLNESS
[de-identified] : 42 y/o F with Hx of Left Meniere's Dz.  Taking HCTZ 12.5 mg once a week.  Last episode of vertigo was over 2 years ago.  Feels hearing in the left ear is muffled.  Doing low salt diet.  Notes having left ear fullness for several weeks.  Took Medrol dose pack and notes decrease in severity and is now intermittent.  Not fully resolved.

## 2024-09-03 NOTE — PROCEDURE
[FreeTextEntry3] : Procedure - Cerumen Removal. Indication - Obstructing visualization of TM After informed verbal consent is obtained, cerumen was removed from the b/l ear canal(s) with a curette.  Normal appearing canal(s) following removal.

## 2024-09-03 NOTE — PHYSICAL EXAM
[Midline] : trachea located in midline position [Normal] : no rashes [de-identified] : b/l vincentn

## 2024-09-03 NOTE — HISTORY OF PRESENT ILLNESS
[de-identified] : 44 y/o F with Hx of Left Meniere's Dz.  Taking HCTZ 12.5 mg once a week.  Last episode of vertigo was over 2 years ago.  Feels hearing in the left ear is muffled.  Doing low salt diet.  Notes having left ear fullness for several weeks.  Took Medrol dose pack and notes decrease in severity and is now intermittent.  Not fully resolved.

## 2024-09-03 NOTE — DATA REVIEWED
[de-identified] : VNG-left perish weakness Hearing Test performed to evaluate the extent of hearing loss and  to explain pt's symptoms today's hearing test was personally reviewed and revealed Tymps-wnl Hearing-left LF SNHL

## 2024-09-03 NOTE — ASSESSMENT
[FreeTextEntry1] : Left Meniere's Dz with sensation of fullness and muffled hearing in left ear, Decreased but not improved with Medrol dose Pack: - Audiogram today - Acupuncture - Continue HCTZ to qd - Continue Low salt diet   Cerumen: - Removed

## 2024-09-03 NOTE — PHYSICAL EXAM
[Midline] : trachea located in midline position [Normal] : no rashes [de-identified] : b/l vincentn

## 2024-09-03 NOTE — HISTORY OF PRESENT ILLNESS
[de-identified] : 42 y/o F with Hx of Left Meniere's Dz.  Taking HCTZ 12.5 mg once a week.  Last episode of vertigo was over 2 years ago.  Feels hearing in the left ear is muffled.  Doing low salt diet.  Notes having left ear fullness for several weeks.  Took Medrol dose pack and notes decrease in severity and is now intermittent.  Not fully resolved.

## 2024-10-10 ENCOUNTER — APPOINTMENT (OUTPATIENT)
Dept: OTOLARYNGOLOGY | Facility: CLINIC | Age: 44
End: 2024-10-10
Payer: MEDICAID

## 2024-10-10 VITALS
HEART RATE: 85 BPM | SYSTOLIC BLOOD PRESSURE: 102 MMHG | DIASTOLIC BLOOD PRESSURE: 67 MMHG | WEIGHT: 118 LBS | BODY MASS INDEX: 21.71 KG/M2 | HEIGHT: 62 IN

## 2024-10-10 DIAGNOSIS — H81.02 MENIERE'S DISEASE, LEFT EAR: ICD-10-CM

## 2024-10-10 PROCEDURE — 99213 OFFICE O/P EST LOW 20 MIN: CPT

## 2024-10-10 PROCEDURE — G2211 COMPLEX E/M VISIT ADD ON: CPT | Mod: NC

## 2024-12-19 ENCOUNTER — NON-APPOINTMENT (OUTPATIENT)
Age: 44
End: 2024-12-19

## 2024-12-19 ENCOUNTER — LABORATORY RESULT (OUTPATIENT)
Age: 44
End: 2024-12-19

## 2024-12-19 ENCOUNTER — APPOINTMENT (OUTPATIENT)
Dept: OBGYN | Facility: CLINIC | Age: 44
End: 2024-12-19
Payer: MEDICAID

## 2024-12-19 VITALS
OXYGEN SATURATION: 100 % | BODY MASS INDEX: 19.94 KG/M2 | HEART RATE: 89 BPM | DIASTOLIC BLOOD PRESSURE: 73 MMHG | WEIGHT: 109 LBS | SYSTOLIC BLOOD PRESSURE: 108 MMHG

## 2024-12-19 DIAGNOSIS — Z01.419 ENCOUNTER FOR GYNECOLOGICAL EXAMINATION (GENERAL) (ROUTINE) W/OUT ABNORMAL FINDINGS: ICD-10-CM

## 2024-12-19 PROCEDURE — 99396 PREV VISIT EST AGE 40-64: CPT | Mod: 25

## 2024-12-19 PROCEDURE — G0444 DEPRESSION SCREEN ANNUAL: CPT | Mod: 59

## 2025-01-30 ENCOUNTER — APPOINTMENT (OUTPATIENT)
Dept: OTOLARYNGOLOGY | Facility: CLINIC | Age: 45
End: 2025-01-30
Payer: MEDICAID

## 2025-01-30 VITALS — HEIGHT: 62 IN | BODY MASS INDEX: 21.71 KG/M2 | WEIGHT: 118 LBS

## 2025-01-30 DIAGNOSIS — H61.23 IMPACTED CERUMEN, BILATERAL: ICD-10-CM

## 2025-01-30 DIAGNOSIS — H81.02 MENIERE'S DISEASE, LEFT EAR: ICD-10-CM

## 2025-01-30 DIAGNOSIS — H93.13 TINNITUS, BILATERAL: ICD-10-CM

## 2025-01-30 DIAGNOSIS — H81.92 UNSPECIFIED DISORDER OF VESTIBULAR FUNCTION, LEFT EAR: ICD-10-CM

## 2025-01-30 DIAGNOSIS — M26.609 UNSPECIFIED TEMPOROMANDIBULAR JOINT DISORDER: ICD-10-CM

## 2025-01-30 DIAGNOSIS — H90.3 SENSORINEURAL HEARING LOSS, BILATERAL: ICD-10-CM

## 2025-01-30 PROCEDURE — 99213 OFFICE O/P EST LOW 20 MIN: CPT | Mod: 25

## 2025-01-30 PROCEDURE — 92567 TYMPANOMETRY: CPT

## 2025-01-30 PROCEDURE — 92557 COMPREHENSIVE HEARING TEST: CPT

## 2025-01-30 PROCEDURE — 92588 EVOKED AUDITORY TST COMPLETE: CPT

## 2025-03-03 ENCOUNTER — NON-APPOINTMENT (OUTPATIENT)
Age: 45
End: 2025-03-03

## 2025-03-03 RX ORDER — HYDROCHLOROTHIAZIDE 12.5 MG/1
12.5 CAPSULE ORAL DAILY
Qty: 30 | Refills: 5 | Status: ACTIVE | COMMUNITY
Start: 2025-03-03 | End: 1900-01-01

## 2025-03-06 ENCOUNTER — APPOINTMENT (OUTPATIENT)
Dept: OTOLARYNGOLOGY | Facility: CLINIC | Age: 45
End: 2025-03-06
Payer: MEDICAID

## 2025-03-06 DIAGNOSIS — H90.3 SENSORINEURAL HEARING LOSS, BILATERAL: ICD-10-CM

## 2025-03-06 DIAGNOSIS — H81.02 MENIERE'S DISEASE, LEFT EAR: ICD-10-CM

## 2025-03-06 DIAGNOSIS — F41.9 ANXIETY DISORDER, UNSPECIFIED: ICD-10-CM

## 2025-03-06 DIAGNOSIS — H93.13 TINNITUS, BILATERAL: ICD-10-CM

## 2025-03-06 PROCEDURE — 99214 OFFICE O/P EST MOD 30 MIN: CPT | Mod: 25

## 2025-03-06 PROCEDURE — 92567 TYMPANOMETRY: CPT

## 2025-03-06 PROCEDURE — 92588 EVOKED AUDITORY TST COMPLETE: CPT

## 2025-03-06 PROCEDURE — 92557 COMPREHENSIVE HEARING TEST: CPT

## 2025-03-06 RX ORDER — PERPHENAZINE 8 MG
500 TABLET ORAL
Qty: 60 | Refills: 1 | Status: ACTIVE | COMMUNITY
Start: 2025-03-06 | End: 1900-01-01

## 2025-03-06 RX ORDER — METHYLPREDNISOLONE 4 MG/1
4 TABLET ORAL
Qty: 21 | Refills: 1 | Status: ACTIVE | COMMUNITY
Start: 2025-03-06 | End: 1900-01-01

## 2025-03-20 ENCOUNTER — APPOINTMENT (OUTPATIENT)
Dept: OTOLARYNGOLOGY | Facility: CLINIC | Age: 45
End: 2025-03-20

## 2025-04-08 ENCOUNTER — RX RENEWAL (OUTPATIENT)
Age: 45
End: 2025-04-08